# Patient Record
Sex: FEMALE | Employment: FULL TIME | URBAN - METROPOLITAN AREA
[De-identification: names, ages, dates, MRNs, and addresses within clinical notes are randomized per-mention and may not be internally consistent; named-entity substitution may affect disease eponyms.]

---

## 2017-08-04 ENCOUNTER — OFFICE VISIT (OUTPATIENT)
Dept: FAMILY MEDICINE CLINIC | Age: 47
End: 2017-08-04

## 2017-08-04 VITALS
WEIGHT: 193.6 LBS | BODY MASS INDEX: 34.3 KG/M2 | TEMPERATURE: 97.1 F | OXYGEN SATURATION: 98 % | HEIGHT: 63 IN | RESPIRATION RATE: 18 BRPM | DIASTOLIC BLOOD PRESSURE: 76 MMHG | SYSTOLIC BLOOD PRESSURE: 134 MMHG | HEART RATE: 61 BPM

## 2017-08-04 DIAGNOSIS — E78.5 HYPERLIPIDEMIA, UNSPECIFIED HYPERLIPIDEMIA TYPE: ICD-10-CM

## 2017-08-04 DIAGNOSIS — I77.6 VASCULITIS (HCC): Primary | ICD-10-CM

## 2017-08-04 RX ORDER — SIMVASTATIN 20 MG/1
TABLET, FILM COATED ORAL
COMMUNITY

## 2017-08-04 RX ORDER — PHENTERMINE HYDROCHLORIDE 37.5 MG/1
37.5 TABLET ORAL
Qty: 30 TAB | Refills: 2 | Status: SHIPPED | OUTPATIENT
Start: 2017-08-04 | End: 2017-11-03 | Stop reason: SDUPTHER

## 2017-08-04 NOTE — PROGRESS NOTES
HISTORY OF PRESENT ILLNESS  Jc Dubon is a 55 y.o. female. HPI  Leukocytoclastic vasculitis under rheum  Concern for wgt gain   Review of Systems   Skin: Positive for rash. All other systems reviewed and are negative. Past Medical History:   Diagnosis Date    Depression     Hypercholesterolemia     Vasculitis (HCC)        Current Outpatient Prescriptions:     simvastatin (ZOCOR) 20 mg tablet, Take  by mouth nightly., Disp: , Rfl:     methotrexate (RHEUMATREX) 2.5 mg tablet, Take  by mouth., Disp: , Rfl:     clindamycin (CLEOCIN) 300 mg capsule, Take 300 mg by mouth three (3) times daily. , Disp: , Rfl:     citalopram (CELEXA) 20 mg tablet, Take  by mouth daily. , Disp: , Rfl:     topiramate (TOPAMAX) 25 mg tablet, Take  by mouth two (2) times a day., Disp: , Rfl:     lidocaine (LIDOCAINE VISCOUS) 2 % solution, Take 15 mL by mouth as needed for Pain., Disp: , Rfl:     acetaminophen-codeine (TYLENOL-CODEINE #3) 300-30 mg per tablet, Take 1 Tab by mouth every four (4) hours as needed for Pain., Disp: , Rfl:     folic acid 728 mcg tablet, Take 400 mcg by mouth daily. , Disp: , Rfl:     HYDROcodone-acetaminophen (NORCO) 5-325 mg per tablet, Take 1 Tab by mouth every four (4) hours as needed for Pain. Max Daily Amount: 6 Tabs., Disp: 12 Tab, Rfl: 0  Visit Vitals    /76 (BP 1 Location: Right arm, BP Patient Position: Sitting)    Pulse 61    Temp 97.1 °F (36.2 °C) (Oral)    Resp 18    Ht 5' 3\" (1.6 m)    Wt 193 lb 9.6 oz (87.8 kg)    SpO2 98%    BMI 34.29 kg/m2       Physical Exam   Constitutional: She appears well-developed and well-nourished. No distress. Cardiovascular: Normal rate, regular rhythm, normal heart sounds and intact distal pulses. Exam reveals no gallop and no friction rub. No murmur heard. Pulmonary/Chest: Effort normal and breath sounds normal. No respiratory distress. She has no wheezes. She has no rales. She exhibits no tenderness. Skin: Skin is warm and dry. No rash noted. She is not diaphoretic. No erythema. No pallor. Vitals reviewed.       ASSESSMENT and PLAN  HL   Vasculitis  Plan  adipex  Recheck in 3 months  Old records

## 2017-08-04 NOTE — MR AVS SNAPSHOT
Visit Information Date & Time Provider Department Dept. Phone Encounter #  
 8/4/2017  9:30 AM Nikki Joy, [employer] [phone] [id number] Follow-up Instructions Return in about 3 months (around 11/4/2017). Follow-up and Disposition History Upcoming Health Maintenance Date Due DTaP/Tdap/Td series (1 - Tdap) 10/15/1991 PAP AKA CERVICAL CYTOLOGY 10/15/1991 INFLUENZA AGE 9 TO ADULT 8/1/2017 Allergies as of 8/4/2017  Review Complete On: 8/4/2017 By: Nikki Joy MD  
  
 Severity Noted Reaction Type Reactions Sulfa (Sulfonamide Antibiotics)  11/28/2016    Hives Current Immunizations  Never Reviewed No immunizations on file. Not reviewed this visit You Were Diagnosed With   
  
 Codes Comments [facility]    -  Primary ICD-10-CM: I77.6 ICD-9-CM: 447.6 Hyperlipidemia, unspecified hyperlipidemia type     ICD-10-CM: E78.5 ICD-9-CM: 272.4 Vitals BP Pulse Temp Resp Height(growth percentile) Weight(growth percentile) 134/76 (BP 1 Location: Right arm, BP Patient Position: Sitting) 61 97.1 °F (36.2 °C) (Oral) 18 5' 3\" (1.6 m) 193 lb 9.6 oz (87.8 kg) SpO2 BMI Smoking Status 98% 34.29 kg/m2 Never Smoker BMI and BSA Data Body Mass Index Body Surface Area  
 34.29 kg/m 2 1.98 m 2 Your Updated Medication List  
  
   
This list is accurate as of: 8/4/17 10:30 AM.  Always use your most recent med list.  
  
  
  
  
 citalopram 20 mg tablet Commonly known as:  Mara Penning Take  by mouth daily. clindamycin 300 mg capsule Commonly known as:  CLEOCIN Take 300 mg by mouth three (3) times daily. folic acid 579 mcg tablet Take 400 mcg by mouth daily. HYDROcodone-acetaminophen 5-325 mg per tablet Commonly known as:  Jazz Plum Take 1 Tab by mouth every four (4) hours as needed for Pain. Max Daily Amount: 6 Tabs. LIDOCAINE VISCOUS 2 % solution Generic drug:  lidocaine Take 15 mL by mouth as needed for Pain. methotrexate 2.5 mg tablet Commonly known as:  Moise Cost Take  by mouth. OTHER  
hylotopic lotion Apply as directed  
  
 simvastatin 20 mg tablet Commonly known as:  ZOCOR Take  by mouth nightly. topiramate 25 mg tablet Commonly known as:  TOPAMAX Take  by mouth two (2) times a day. TYLENOL-CODEINE #3 300-30 mg per tablet Generic drug:  acetaminophen-codeine Take 1 Tab by mouth every four (4) hours as needed for Pain. Prescriptions Printed Refills OTHER 3 Sig: hylotopic lotion Apply as directed Class: Print Follow-up Instructions Return in about 3 months (around 11/4/2017). Introducing [facility] SERVICES! Tashia Escalante introduces [employer] patient portal. Now you can access parts of your medical record, email your doctor's office, and request medication refills online. 1. In your internet browser, go to [email or web address] 2. Click on the First Time User? Click Here link in the Sign In box. You will see the New Member Sign Up page. 3. Enter your [employer] Access Code exactly as it appears below. You will not need to use this code after youve completed the sign-up process. If you do not sign up before the expiration date, you must request a new code. · [employer] Access Code: [email or web address] Expires: 9/4/2017 10:08 AM 
 
4. Enter the last four digits of your Social Security Number (xxxx) and Date of Birth (mm/dd/yyyy) as indicated and click Submit. You will be taken to the next sign-up page. 5. Create a [employer] ID. This will be your [employer] login ID and cannot be changed, so think of one that is secure and easy to remember. 6. Create a [employer] password. You can change your password at any time. 7. Enter your Password Reset Question and Answer. This can be used at a later time if you forget your password. 8. Enter your e-mail address. You will receive e-mail notification when new information is available in [address] 9. Click Sign Up. You can now view and download portions of your medical record. 10. Click the Download Summary menu link to download a portable copy of your medical information. If you have questions, please visit the Frequently Asked Questions section of the [employer] website. Remember, [employer] is NOT to be used for urgent needs. For medical emergencies, dial 911. Now available from your iPhone and Android! Please provide this summary of care documentation to your next provider. Your primary care clinician is listed as Sanya Glez. If you have any questions after today's visit, please call [phone].

## 2017-08-04 NOTE — PROGRESS NOTES
Chief Complaint   Patient presents with    New Patient    Establish Care    Weight Gain     patient has concerns about weight gain     Pain scale 0/10

## 2017-08-07 ENCOUNTER — TELEPHONE (OUTPATIENT)
Dept: FAMILY MEDICINE CLINIC | Age: 47
End: 2017-08-07

## 2017-11-03 ENCOUNTER — OFFICE VISIT (OUTPATIENT)
Dept: FAMILY MEDICINE CLINIC | Age: 47
End: 2017-11-03

## 2017-11-03 VITALS
BODY MASS INDEX: 32.78 KG/M2 | DIASTOLIC BLOOD PRESSURE: 71 MMHG | WEIGHT: 185 LBS | TEMPERATURE: 97.2 F | RESPIRATION RATE: 18 BRPM | OXYGEN SATURATION: 95 % | SYSTOLIC BLOOD PRESSURE: 115 MMHG | HEART RATE: 82 BPM | HEIGHT: 63 IN

## 2017-11-03 DIAGNOSIS — F32.A DEPRESSION, UNSPECIFIED DEPRESSION TYPE: Primary | ICD-10-CM

## 2017-11-03 RX ORDER — PHENTERMINE HYDROCHLORIDE 37.5 MG/1
37.5 TABLET ORAL
Qty: 30 TAB | Refills: 2 | Status: SHIPPED | OUTPATIENT
Start: 2017-11-03

## 2017-11-03 RX ORDER — VENLAFAXINE HYDROCHLORIDE 37.5 MG/1
37.5 CAPSULE, EXTENDED RELEASE ORAL DAILY
Qty: 15 CAP | Refills: 0 | Status: SHIPPED | OUTPATIENT
Start: 2017-11-03

## 2017-11-03 RX ORDER — VENLAFAXINE HYDROCHLORIDE 75 MG/1
75 CAPSULE, EXTENDED RELEASE ORAL DAILY
Qty: 90 CAP | Refills: 1 | Status: SHIPPED | OUTPATIENT
Start: 2017-11-03

## 2017-11-03 NOTE — MR AVS SNAPSHOT
Visit Information Date & Time Provider Department Dept. Phone Encounter #  
 11/3/2017  8:00 AM Lexii Stapleton, [place] [facility] [phone] [id number] Follow-up Instructions Return in about 3 months (around 2/3/2018). Follow-up and Disposition History Upcoming Health Maintenance Date Due DTaP/Tdap/Td series (1 - Tdap) 10/15/1991 PAP AKA CERVICAL CYTOLOGY 10/15/1991 Allergies as of 11/3/2017  Review Complete On: 11/3/2017 By: Lexii Stapleton MD  
  
 Severity Noted Reaction Type Reactions Sulfa (Sulfonamide Antibiotics)  11/28/2016    Hives Current Immunizations  Never Reviewed No immunizations on file. Not reviewed this visit You Were Diagnosed With   
  
 Codes Comments Depression, unspecified depression type    -  Primary ICD-10-CM: F32.9 ICD-9-CM: 393 Vitals BP Pulse Temp Resp Height(growth percentile) Weight(growth percentile) 115/71 (BP 1 Location: Right arm, BP Patient Position: Sitting) 82 97.2 °F (36.2 °C) (Oral) 18 5' 3\" (1.6 m) 185 lb (83.9 kg) SpO2 BMI OB Status Smoking Status 95% 32.77 kg/m2 Medically Induced Never Smoker Vitals History BMI and BSA Data Body Mass Index Body Surface Area 32.77 kg/m 2 1.93 m 2 Your Updated Medication List  
  
   
This list is accurate as of: 11/3/17  8:42 AM.  Always use your most recent med list.  
  
  
  
  
 citalopram 20 mg tablet Commonly known as:  Lenward Campanile Take  by mouth daily. folic acid 148 mcg tablet Take 400 mcg by mouth daily. LIDOCAINE VISCOUS 2 % solution Generic drug:  lidocaine Take 15 mL by mouth as needed for Pain. methotrexate 2.5 mg tablet Commonly known as:  Faby Putt Take  by mouth. OTHER  
hylotopic lotion Apply as directed  
  
 phentermine 37.5 mg tablet Commonly known as:  ADIPEX-P Take 1 Tab by mouth every morning.  Max Daily Amount: 37.5 mg.  
  
 simvastatin 20 mg tablet Commonly known as:  ZOCOR Take  by mouth nightly. topiramate 25 mg tablet Commonly known as:  TOPAMAX Take  by mouth two (2) times a day. TYLENOL-CODEINE #3 300-30 mg per tablet Generic drug:  acetaminophen-codeine Take 1 Tab by mouth every four (4) hours as needed for Pain. * venlafaxine-SR 37.5 mg capsule Commonly known as:  EFFEXOR-XR Take 1 Cap by mouth daily. * venlafaxine-SR 75 mg capsule Commonly known as:  EFFEXOR-XR Take 1 Cap by mouth daily. * Notice: This list has 2 medication(s) that are the same as other medications prescribed for you. Read the directions carefully, and ask your doctor or other care provider to review them with you. Prescriptions Printed Refills  
 venlafaxine-SR (EFFEXOR-XR) 37.5 mg capsule 0 Sig: Take 1 Cap by mouth daily. Class: Print Route: Oral  
 venlafaxine-SR (EFFEXOR-XR) 75 mg capsule 1 Sig: Take 1 Cap by mouth daily. Class: Print Route: Oral  
 phentermine (ADIPEX-P) 37.5 mg tablet 2 Sig: Take 1 Tab by mouth every morning. Max Daily Amount: 37.5 mg.  
 Class: Print Route: Oral  
  
Follow-up Instructions Return in about 3 months (around 2/3/2018). Introducing [facility]! Mitchell Pal introduces [employer] patient portal. Now you can access parts of your medical record, email your doctor's office, and request medication refills online. 1. In your internet browser, go to [email or web address] 2. Click on the First Time User? Click Here link in the Sign In box. You will see the New Member Sign Up page. 3. Enter your [employer] Access Code exactly as it appears below. You will not need to use this code after youve completed the sign-up process. If you do not sign up before the expiration date, you must request a new code. · [employer] Access Code: [email or web address] Expires: 2/1/2018  8:25 AM 
 
 4. Enter the last four digits of your Social Security Number (xxxx) and Date of Birth (mm/dd/yyyy) as indicated and click Submit. You will be taken to the next sign-up page. 5. Create a [employer] ID. This will be your [employer] login ID and cannot be changed, so think of one that is secure and easy to remember. 6. Create a [employer] password. You can change your password at any time. 7. Enter your Password Reset Question and Answer. This can be used at a later time if you forget your password. 8. Enter your e-mail address. You will receive e-mail notification when new information is available in [address] 9. Click Sign Up. You can now view and download portions of your medical record. 10. Click the Download Summary menu link to download a portable copy of your medical information. If you have questions, please visit the Frequently Asked Questions section of the [employer] website. Remember, [employer] is NOT to be used for urgent needs. For medical emergencies, dial 911. Now available from your iPhone and Android! Please provide this summary of care documentation to your next provider. Your primary care clinician is listed as Wesley Salazar. If you have any questions after today's visit, please call [phone].

## 2017-11-03 NOTE — PROGRESS NOTES
[facility] is a 52 y.o. female  Chief Complaint   Patient presents with    Medication Evaluation     1. Have you been to the ER, urgent care clinic or hospitalized since your last visit? NO.     2. Have you seen or consulted any other health care providers outside of the [address] [place] [address] since your last visit (Include any pap smears or colon screening)? YES, [facility]     Do you have an Advanced Directive? YES    Would you like information on Advanced Directives?  NO

## 2017-11-03 NOTE — PROGRESS NOTES
HISTORY OF PRESENT ILLNESS  Jessy Hopkins is a 52 y.o. female. HPI  C/o depression  celexa ineffective  Review of Systems   Psychiatric/Behavioral: Positive for depression. All other systems reviewed and are negative. Past Medical History:   Diagnosis Date    Depression     Hypercholesterolemia     Vasculitis ([address]      Current Outpatient Prescriptions on File Prior to Visit   Medication Sig Dispense Refill    simvastatin (ZOCOR) 20 mg tablet Take  by mouth nightly.  phentermine (ADIPEX-P) 37.5 mg tablet Take 1 Tab by mouth every morning. Max Daily Amount: 37.5 mg. 30 Tab 2    methotrexate (RHEUMATREX) 2.5 mg tablet Take  by mouth.  citalopram (CELEXA) 20 mg tablet Take  by mouth daily.  topiramate (TOPAMAX) 25 mg tablet Take  by mouth two (2) times a day.  folic acid 306 mcg tablet Take 400 mcg by mouth daily.  OTHER hylotopic lotion  Apply as directed 1 Container 3    lidocaine (LIDOCAINE VISCOUS) 2 % solution Take 15 mL by mouth as needed for Pain.  acetaminophen-codeine (TYLENOL-CODEINE #3) 300-30 mg per tablet Take 1 Tab by mouth every four (4) hours as needed for Pain. No current facility-administered medications on file prior to visit. Visit Vitals    /71 (BP 1 Location: Right arm, BP Patient Position: Sitting)    Pulse 82    Temp 97.2 °F (36.2 °C) (Oral)    Resp 18    Ht 5' 3\" (1.6 m)    Wt 185 lb (83.9 kg)    SpO2 95%    BMI 32.77 kg/m2         Physical Exam   Constitutional: She appears well-developed and well-nourished. No distress. Cardiovascular: Normal rate, regular rhythm, normal heart sounds and intact distal pulses. Exam reveals no gallop and no friction rub. No murmur heard. Skin: She is not diaphoretic. Psychiatric: She has a normal mood and affect. Her behavior is normal. Judgment and thought content normal.   Vitals reviewed.       ASSESSMENT and PLAN  depression   Plan  Switch to effexor  Recheck in 3 months  refills